# Patient Record
Sex: MALE | Race: WHITE | Employment: OTHER | URBAN - METROPOLITAN AREA
[De-identification: names, ages, dates, MRNs, and addresses within clinical notes are randomized per-mention and may not be internally consistent; named-entity substitution may affect disease eponyms.]

---
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
## 2024-01-01 NOTE — ED NOTES
[facility] accepted patient, spoke with Chyna Henning and notified Juan Pardo with Case Management. Chyna Henning will call ED back with accepting doctor and info to give report. Yes